# Patient Record
Sex: FEMALE | Race: WHITE | NOT HISPANIC OR LATINO
[De-identification: names, ages, dates, MRNs, and addresses within clinical notes are randomized per-mention and may not be internally consistent; named-entity substitution may affect disease eponyms.]

---

## 2017-06-28 ENCOUNTER — FORM ENCOUNTER (OUTPATIENT)
Age: 67
End: 2017-06-28

## 2017-06-29 ENCOUNTER — APPOINTMENT (OUTPATIENT)
Dept: ULTRASOUND IMAGING | Facility: CLINIC | Age: 67
End: 2017-06-29

## 2017-06-29 ENCOUNTER — OUTPATIENT (OUTPATIENT)
Dept: OUTPATIENT SERVICES | Facility: HOSPITAL | Age: 67
LOS: 1 days | End: 2017-06-29
Payer: MEDICARE

## 2017-06-29 ENCOUNTER — APPOINTMENT (OUTPATIENT)
Dept: MAMMOGRAPHY | Facility: CLINIC | Age: 67
End: 2017-06-29

## 2017-06-29 DIAGNOSIS — Z00.8 ENCOUNTER FOR OTHER GENERAL EXAMINATION: ICD-10-CM

## 2017-06-29 DIAGNOSIS — C50.919 MALIGNANT NEOPLASM OF UNSPECIFIED SITE OF UNSPECIFIED FEMALE BREAST: ICD-10-CM

## 2017-06-29 PROCEDURE — 77067 SCR MAMMO BI INCL CAD: CPT

## 2017-06-29 PROCEDURE — 77063 BREAST TOMOSYNTHESIS BI: CPT

## 2017-06-29 PROCEDURE — 76641 ULTRASOUND BREAST COMPLETE: CPT

## 2017-10-28 ENCOUNTER — TRANSCRIPTION ENCOUNTER (OUTPATIENT)
Age: 67
End: 2017-10-28

## 2017-11-08 ENCOUNTER — TRANSCRIPTION ENCOUNTER (OUTPATIENT)
Age: 67
End: 2017-11-08

## 2017-12-18 ENCOUNTER — APPOINTMENT (OUTPATIENT)
Dept: SURGICAL ONCOLOGY | Facility: CLINIC | Age: 67
End: 2017-12-18
Payer: MEDICARE

## 2017-12-18 VITALS
HEART RATE: 62 BPM | SYSTOLIC BLOOD PRESSURE: 108 MMHG | BODY MASS INDEX: 25.55 KG/M2 | DIASTOLIC BLOOD PRESSURE: 65 MMHG | WEIGHT: 159 LBS | OXYGEN SATURATION: 97 % | RESPIRATION RATE: 14 BRPM | HEIGHT: 66 IN

## 2017-12-18 PROCEDURE — 99213 OFFICE O/P EST LOW 20 MIN: CPT

## 2017-12-18 RX ORDER — ATORVASTATIN CALCIUM 80 MG/1
TABLET, FILM COATED ORAL
Refills: 0 | Status: ACTIVE | COMMUNITY

## 2017-12-18 RX ORDER — SACUBITRIL AND VALSARTAN 24; 26 MG/1; MG/1
24-26 TABLET, FILM COATED ORAL
Refills: 0 | Status: ACTIVE | COMMUNITY

## 2018-04-26 ENCOUNTER — RESULT REVIEW (OUTPATIENT)
Age: 68
End: 2018-04-26

## 2018-07-04 ENCOUNTER — FORM ENCOUNTER (OUTPATIENT)
Age: 68
End: 2018-07-04

## 2018-07-05 ENCOUNTER — APPOINTMENT (OUTPATIENT)
Dept: ULTRASOUND IMAGING | Facility: CLINIC | Age: 68
End: 2018-07-05
Payer: MEDICARE

## 2018-07-05 ENCOUNTER — APPOINTMENT (OUTPATIENT)
Dept: MAMMOGRAPHY | Facility: CLINIC | Age: 68
End: 2018-07-05
Payer: MEDICARE

## 2018-07-05 ENCOUNTER — OUTPATIENT (OUTPATIENT)
Dept: OUTPATIENT SERVICES | Facility: HOSPITAL | Age: 68
LOS: 1 days | End: 2018-07-05
Payer: MEDICARE

## 2018-07-05 DIAGNOSIS — C50.919 MALIGNANT NEOPLASM OF UNSPECIFIED SITE OF UNSPECIFIED FEMALE BREAST: ICD-10-CM

## 2018-07-05 PROCEDURE — 77067 SCR MAMMO BI INCL CAD: CPT | Mod: 26

## 2018-07-05 PROCEDURE — 76641 ULTRASOUND BREAST COMPLETE: CPT | Mod: 26,50

## 2018-07-05 PROCEDURE — 76641 ULTRASOUND BREAST COMPLETE: CPT

## 2018-07-05 PROCEDURE — 77067 SCR MAMMO BI INCL CAD: CPT

## 2018-07-05 PROCEDURE — 77063 BREAST TOMOSYNTHESIS BI: CPT

## 2018-07-05 PROCEDURE — 77063 BREAST TOMOSYNTHESIS BI: CPT | Mod: 26

## 2018-08-13 ENCOUNTER — APPOINTMENT (OUTPATIENT)
Dept: PULMONOLOGY | Facility: CLINIC | Age: 68
End: 2018-08-13

## 2018-12-17 ENCOUNTER — APPOINTMENT (OUTPATIENT)
Dept: SURGICAL ONCOLOGY | Facility: CLINIC | Age: 68
End: 2018-12-17
Payer: MEDICARE

## 2018-12-17 VITALS
WEIGHT: 159 LBS | HEIGHT: 66 IN | SYSTOLIC BLOOD PRESSURE: 107 MMHG | RESPIRATION RATE: 15 BRPM | HEART RATE: 78 BPM | BODY MASS INDEX: 25.55 KG/M2 | DIASTOLIC BLOOD PRESSURE: 70 MMHG

## 2018-12-17 PROCEDURE — 99214 OFFICE O/P EST MOD 30 MIN: CPT

## 2019-07-31 ENCOUNTER — FORM ENCOUNTER (OUTPATIENT)
Age: 69
End: 2019-07-31

## 2019-08-01 ENCOUNTER — APPOINTMENT (OUTPATIENT)
Dept: ULTRASOUND IMAGING | Facility: CLINIC | Age: 69
End: 2019-08-01
Payer: MEDICARE

## 2019-08-01 ENCOUNTER — APPOINTMENT (OUTPATIENT)
Dept: MAMMOGRAPHY | Facility: CLINIC | Age: 69
End: 2019-08-01
Payer: MEDICARE

## 2019-08-01 ENCOUNTER — OUTPATIENT (OUTPATIENT)
Dept: OUTPATIENT SERVICES | Facility: HOSPITAL | Age: 69
LOS: 1 days | End: 2019-08-01
Payer: MEDICARE

## 2019-08-01 DIAGNOSIS — Z00.8 ENCOUNTER FOR OTHER GENERAL EXAMINATION: ICD-10-CM

## 2019-08-01 PROCEDURE — 76641 ULTRASOUND BREAST COMPLETE: CPT | Mod: 26,50

## 2019-08-01 PROCEDURE — 76641 ULTRASOUND BREAST COMPLETE: CPT

## 2019-08-01 PROCEDURE — 77063 BREAST TOMOSYNTHESIS BI: CPT

## 2019-08-01 PROCEDURE — 77063 BREAST TOMOSYNTHESIS BI: CPT | Mod: 26

## 2019-08-01 PROCEDURE — 77067 SCR MAMMO BI INCL CAD: CPT | Mod: 26

## 2019-08-01 PROCEDURE — 77067 SCR MAMMO BI INCL CAD: CPT

## 2020-01-09 ENCOUNTER — APPOINTMENT (OUTPATIENT)
Dept: SURGICAL ONCOLOGY | Facility: CLINIC | Age: 70
End: 2020-01-09
Payer: MEDICARE

## 2020-01-09 VITALS
BODY MASS INDEX: 22.18 KG/M2 | HEART RATE: 97 BPM | WEIGHT: 138 LBS | RESPIRATION RATE: 15 BRPM | HEIGHT: 66 IN | DIASTOLIC BLOOD PRESSURE: 82 MMHG | SYSTOLIC BLOOD PRESSURE: 141 MMHG

## 2020-01-09 PROCEDURE — 99214 OFFICE O/P EST MOD 30 MIN: CPT

## 2020-01-10 NOTE — ASSESSMENT
[FreeTextEntry1] : Clinically doing well.\par \par August 2019 bilateral mammogram and sonogram at 450: BI-RADS 2.\par \par Studies will be repeated August 2020, prescription entered.\par \par October 2015 breast MRI was BI-RADS-1.\par \par Clinically doing well.\par If no problems we will see her in another year, sooner if needed

## 2020-01-10 NOTE — REVIEW OF SYSTEMS
[Negative] : Endocrine [FreeTextEntry5] : cAD [FreeTextEntry7] : gout [FreeTextEntry1] : breast cancer

## 2020-01-10 NOTE — HISTORY OF PRESENT ILLNESS
[de-identified] : She had a percutaneous mitral valve repair @[facility] on December 20, 2018\par May 2019 she had a defibrillator placed.\par August 2019 she had a HEART TRANSPLANT @[facility]\par \par 69 year-old Nulliparous lady who in November 2001 had LEFT breast conserving surgery for LN+ stage II breast cancer, with associated LCIS.\par +postoperative chemotherapy and Arimidex under the supervision of Dr. Romero Malave.\par Her postoperative radiation therapy was overseen by Isaiah Rodriguez\par \par Other bilateral breast Biopsies were benign.\par \par +FH:\par Her maternal grandmother and a maternal cousin had breast cancer.\par She prefers NOT to go for genetics evaluation.\par \par Her internist is Dr. 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 \par During the recuperation from the above heart transplant, she had a recurrent episode of gout.\par Now takes allopurinol.\par Her rheumatologist is Dr. Dawson\par \par Her cardiologist is Dr. Hemanth LOPEZ\par \par \par Her gynecologist is Dr. Drea SULTANA, February 2017 appointment was ok\par \par Colonoscopy: was March 2016 with DECLAN Pratt\par \par No signs or symptoms related to either breast

## 2020-01-10 NOTE — REASON FOR VISIT
[Follow-Up Visit] : a follow-up visit for [Other: _____] : [unfilled] [FreeTextEntry2] : followup left breast cancer

## 2020-08-07 ENCOUNTER — RESULT REVIEW (OUTPATIENT)
Age: 70
End: 2020-08-07

## 2020-08-07 ENCOUNTER — APPOINTMENT (OUTPATIENT)
Dept: ULTRASOUND IMAGING | Facility: CLINIC | Age: 70
End: 2020-08-07

## 2020-08-07 ENCOUNTER — OUTPATIENT (OUTPATIENT)
Dept: OUTPATIENT SERVICES | Facility: HOSPITAL | Age: 70
LOS: 1 days | End: 2020-08-07
Payer: MEDICARE

## 2020-08-07 ENCOUNTER — APPOINTMENT (OUTPATIENT)
Dept: MAMMOGRAPHY | Facility: CLINIC | Age: 70
End: 2020-08-07

## 2020-08-07 DIAGNOSIS — C50.919 MALIGNANT NEOPLASM OF UNSPECIFIED SITE OF UNSPECIFIED FEMALE BREAST: ICD-10-CM

## 2020-08-07 PROCEDURE — 76641 ULTRASOUND BREAST COMPLETE: CPT

## 2020-08-07 PROCEDURE — 77067 SCR MAMMO BI INCL CAD: CPT

## 2020-08-07 PROCEDURE — 76641 ULTRASOUND BREAST COMPLETE: CPT | Mod: 26,50

## 2020-08-07 PROCEDURE — 77063 BREAST TOMOSYNTHESIS BI: CPT

## 2020-08-07 PROCEDURE — 77063 BREAST TOMOSYNTHESIS BI: CPT | Mod: 26

## 2020-08-07 PROCEDURE — 77067 SCR MAMMO BI INCL CAD: CPT | Mod: 26

## 2021-01-07 ENCOUNTER — APPOINTMENT (OUTPATIENT)
Dept: SURGICAL ONCOLOGY | Facility: CLINIC | Age: 71
End: 2021-01-07
Payer: MEDICARE

## 2021-01-07 VITALS
HEIGHT: 65 IN | WEIGHT: 145 LBS | RESPIRATION RATE: 15 BRPM | BODY MASS INDEX: 24.16 KG/M2 | SYSTOLIC BLOOD PRESSURE: 172 MMHG | OXYGEN SATURATION: 98 % | HEART RATE: 106 BPM | DIASTOLIC BLOOD PRESSURE: 99 MMHG

## 2021-01-07 PROCEDURE — 99214 OFFICE O/P EST MOD 30 MIN: CPT

## 2021-01-07 NOTE — PHYSICAL EXAM
[Normal] : supple, no neck mass and thyroid not enlarged [Normal Neck Lymph Nodes] : normal neck lymph nodes  [Normal Supraclavicular Lymph Nodes] : normal supraclavicular lymph nodes [Normal Axillary Lymph Nodes] : normal axillary lymph nodes [Normal] : normal appearance, no rash, nodules, vesicles, ulcers, erythema [de-identified] : Groins not examined [de-identified] : below

## 2021-01-07 NOTE — REVIEW OF SYSTEMS
[Negative] : Integumentary [FreeTextEntry5] : Heart transplant [FreeTextEntry7] : Gout [de-identified] : Steroids induced diabetes [FreeTextEntry1] : Breast cancer

## 2021-01-07 NOTE — HISTORY OF PRESENT ILLNESS
[de-identified] : She had a percutaneous mitral valve repair @[facility] on December 20, 2018\par May 2019 she had a defibrillator placed.\par August 2019 she had a HEART TRANSPLANT @[facility]\par \par September 2020:\par Diagnosed and treated for stage Ib RIGHT LUNG CANCER thoracic surgery at [facility].\par As was a solitary pulmonary nodule which had changed on follow-up imaging.\par \par 70 year-old Nulliparous lady who in November 2001 had LEFT breast conserving surgery for LN+ stage II breast cancer, with associated LCIS.\par +postoperative chemotherapy and Arimidex under the supervision of Dr. Romero Malave.\par Her postoperative radiation therapy was overseen by Isaiah Rodriguez\par \par No signs or symptoms related to either breast\par \par Other bilateral breast Biopsies were benign.\par \par +FH:\par Her maternal grandmother AND a maternal cousin had breast cancer.\par \par \par She prefers NOT to go for genetics evaluation.\par \par \par Her internist is Dr. Mary LOPEZ\par \par +AICD\par \par Her cardiologist is Dr. 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 \par During the recuperation from the above heart transplant, she had a recurrent episode of gout.\par Now takes allopurinol.\par Her rheumatologist is Dr. Arian CHI\par \par \par Her gynecologist is Dr. Drea SULTANA, February 2020 appointment was ok\par \par \par Colonoscopy: was March 2016 with DECLAN Pratt\par \par

## 2021-01-07 NOTE — ASSESSMENT
[FreeTextEntry1] : Clinically doing well.\par \par August 2020 bilateral mammogram and sonogram at 450: BI-RADS 2.\par \par Studies will be repeated August 2021, prescription entered.\par \par October 2015 breast MRI was BI-RADS-1.\par Follow-up MRI is contraindicated because of her defibrillator (AICD)\par \par Clinically doing well.\par If no problems we will see her in another year, sooner if needed

## 2021-06-22 ENCOUNTER — RESULT REVIEW (OUTPATIENT)
Age: 71
End: 2021-06-22

## 2021-08-10 ENCOUNTER — RESULT REVIEW (OUTPATIENT)
Age: 71
End: 2021-08-10

## 2021-08-10 ENCOUNTER — APPOINTMENT (OUTPATIENT)
Dept: MAMMOGRAPHY | Facility: CLINIC | Age: 71
End: 2021-08-10
Payer: MEDICARE

## 2021-08-10 ENCOUNTER — OUTPATIENT (OUTPATIENT)
Dept: OUTPATIENT SERVICES | Facility: HOSPITAL | Age: 71
LOS: 1 days | End: 2021-08-10
Payer: MEDICARE

## 2021-08-10 ENCOUNTER — APPOINTMENT (OUTPATIENT)
Dept: ULTRASOUND IMAGING | Facility: CLINIC | Age: 71
End: 2021-08-10
Payer: MEDICARE

## 2021-08-10 DIAGNOSIS — C50.919 MALIGNANT NEOPLASM OF UNSPECIFIED SITE OF UNSPECIFIED FEMALE BREAST: ICD-10-CM

## 2021-08-10 PROCEDURE — 77063 BREAST TOMOSYNTHESIS BI: CPT

## 2021-08-10 PROCEDURE — 77067 SCR MAMMO BI INCL CAD: CPT | Mod: 26

## 2021-08-10 PROCEDURE — 76641 ULTRASOUND BREAST COMPLETE: CPT | Mod: 26,50

## 2021-08-10 PROCEDURE — 77067 SCR MAMMO BI INCL CAD: CPT

## 2021-08-10 PROCEDURE — 76641 ULTRASOUND BREAST COMPLETE: CPT

## 2021-08-10 PROCEDURE — 77063 BREAST TOMOSYNTHESIS BI: CPT | Mod: 26

## 2022-01-06 ENCOUNTER — APPOINTMENT (OUTPATIENT)
Dept: SURGICAL ONCOLOGY | Facility: CLINIC | Age: 72
End: 2022-01-06
Payer: MEDICARE

## 2022-01-06 VITALS
HEIGHT: 65 IN | WEIGHT: 148 LBS | BODY MASS INDEX: 24.66 KG/M2 | HEART RATE: 102 BPM | OXYGEN SATURATION: 96 % | DIASTOLIC BLOOD PRESSURE: 85 MMHG | SYSTOLIC BLOOD PRESSURE: 138 MMHG | RESPIRATION RATE: 16 BRPM | TEMPERATURE: 98.3 F

## 2022-01-06 PROCEDURE — 99214 OFFICE O/P EST MOD 30 MIN: CPT

## 2022-01-06 NOTE — REVIEW OF SYSTEMS
[Negative] : Heme/Lymph [FreeTextEntry5] : AICD [FreeTextEntry6] : Lung cancer [FreeTextEntry7] : Heart transplant [de-identified] : gout [de-identified] : DM [FreeTextEntry1] : breast cancer

## 2022-01-06 NOTE — PHYSICAL EXAM
[Normal] : supple, no neck mass and thyroid not enlarged [Normal Neck Lymph Nodes] : normal neck lymph nodes  [Normal Supraclavicular Lymph Nodes] : normal supraclavicular lymph nodes [Normal Axillary Lymph Nodes] : normal axillary lymph nodes [Normal] : normal appearance, no rash, nodules, vesicles, ulcers, erythema [de-identified] : Groins not examined [de-identified] : below

## 2022-01-06 NOTE — HISTORY OF PRESENT ILLNESS
[de-identified] : She had a percutaneous mitral valve repair @[facility] on December 20, 2018\par May 2019 she had a defibrillator placed.\par August 2019 she had a HEART TRANSPLANT @[facility]\par \par September 2020:\par Diagnosed and treated for stage Ib RIGHT LUNG CANCER thoracic surgery at [facility].\par It was a solitary pulmonary nodule which had changed on follow-up imaging.\par \par \par 71 year-old Nulliparous lady:\par November 2001: LEFT breast conserving surgery for LN+ stage II breast cancer\par +associated LCIS.\par +postoperative chemotherapy and Arimidex: Dr. Romero Finch.\par + postoperative radiation therapy: Isaiah JEFFERSON\par \par No signs or symptoms related to either breast\par \par +Prior personal hx:\par Other bilateral breast Biopsies were benign.\par \par +FH:\par Her Paternal grandmother AND a Maternal cousin had breast cancer.\par No relatives w/ ovarian cancer\par \par + Ashkenazi.\par \par No other FH of malignancy.\par \par \par She prefers NOT to go for genetics evaluation.\par \par Reproductive hx:\par Menarche at 13\par Nulliparous.\par Chemotherapy induced menopause at 51.\par No HRT\par \par \par Her internist is Dr. Mary LOPEZ\par \par +AICD\par \par Her cardiologist is Dr. Hemanth LOPEZ\par \par Current medications:\par Amlodipine for hypertension.\par Atorvastatin hyperlipidemia.\par \par Immunosuppressive agent: Tacrolimus.\par \par She is on Protonix to prevent stress gastritis bleeding.\par \par 2017 episode of gout was treated at urgent care and by a podiatrist with a steroid taper. \par During the recuperation from the above heart transplant, she had a recurrent episode of gout.\par Now takes allopurinol.\par Her rheumatologist is Dr. Arian CHI\par \par \par Her gynecologist is Dr. Drea SULTANA, February 2020 appointment was ok\par \par \par Colonoscopy: was March 2016 with Arian Pratt.\par Fall 2021 colonoscopy with Dr. Leonard LEES was normal\par \par

## 2022-01-06 NOTE — ASSESSMENT
[FreeTextEntry1] : Clinically doing well.\par \par \par Pulm imag........................\par \par \par August 2021 bilateral mammogram and sonogram at 450: BI-RADS 2.\par \par Studies will be repeated August 2022, prescription entered.\par \par October 2015 breast MRI was BI-RADS-1.\par Follow-up MRI is contraindicated because of her defibrillator (AICD)\par \par Clinically doing well.\par If no problems we will see her in another year, sooner if needed

## 2022-08-22 ENCOUNTER — APPOINTMENT (OUTPATIENT)
Dept: MAMMOGRAPHY | Facility: CLINIC | Age: 72
End: 2022-08-22

## 2022-08-22 ENCOUNTER — RESULT REVIEW (OUTPATIENT)
Age: 72
End: 2022-08-22

## 2022-08-22 ENCOUNTER — APPOINTMENT (OUTPATIENT)
Dept: ULTRASOUND IMAGING | Facility: CLINIC | Age: 72
End: 2022-08-22

## 2022-08-22 ENCOUNTER — OUTPATIENT (OUTPATIENT)
Dept: OUTPATIENT SERVICES | Facility: HOSPITAL | Age: 72
LOS: 1 days | End: 2022-08-22
Payer: MEDICARE

## 2022-08-22 DIAGNOSIS — C50.919 MALIGNANT NEOPLASM OF UNSPECIFIED SITE OF UNSPECIFIED FEMALE BREAST: ICD-10-CM

## 2022-08-22 PROCEDURE — 76641 ULTRASOUND BREAST COMPLETE: CPT | Mod: 26,50

## 2022-08-22 PROCEDURE — 77067 SCR MAMMO BI INCL CAD: CPT

## 2022-08-22 PROCEDURE — 76641 ULTRASOUND BREAST COMPLETE: CPT

## 2022-08-22 PROCEDURE — 77063 BREAST TOMOSYNTHESIS BI: CPT | Mod: 26

## 2022-08-22 PROCEDURE — 77063 BREAST TOMOSYNTHESIS BI: CPT

## 2022-08-22 PROCEDURE — 77067 SCR MAMMO BI INCL CAD: CPT | Mod: 26

## 2023-04-20 ENCOUNTER — APPOINTMENT (OUTPATIENT)
Dept: SURGICAL ONCOLOGY | Facility: CLINIC | Age: 73
End: 2023-04-20
Payer: MEDICARE

## 2023-04-20 VITALS
SYSTOLIC BLOOD PRESSURE: 147 MMHG | BODY MASS INDEX: 24.32 KG/M2 | DIASTOLIC BLOOD PRESSURE: 87 MMHG | TEMPERATURE: 97.6 F | HEART RATE: 86 BPM | RESPIRATION RATE: 16 BRPM | OXYGEN SATURATION: 98 % | WEIGHT: 146 LBS | HEIGHT: 65 IN

## 2023-04-20 PROCEDURE — 99214 OFFICE O/P EST MOD 30 MIN: CPT

## 2023-04-21 NOTE — HISTORY OF PRESENT ILLNESS
[de-identified] : She had a percutaneous mitral valve repair @[facility] on December 20, 2018\par May 2019 she had a defibrillator placed.\par August 2019 she had a HEART TRANSPLANT @[facility]\par \par September 2020:\par Diagnosed and treated for stage Ib RIGHT LUNG CANCER thoracic surgery at [facility].\par It was a solitary pulmonary nodule which had changed on follow-up imaging.\par \par \par 72-year-old lady.\par \par Nulliparous.\par \par November 2001: LEFT breast conserving surgery for LN+ stage II breast cancer\par +associated LCIS.\par +postoperative chemotherapy and Arimidex: Dr. Romero Finch.\par + postoperative radiation therapy: Isaiah JEFFERSON\par \par No signs or symptoms related to either breast\par \par +Prior personal hx:\par Other bilateral breast Biopsies were benign.\par \par +FH:\par Her Paternal grandmother AND a Maternal cousin had breast cancer.\par No relatives w/ ovarian cancer\par \par + Ashkenazi.\par \par No other FH of malignancy.\par \par \par She prefers NOT to go for genetics evaluation.\par \par Reproductive hx:\par Menarche at 13\par Nulliparous.\par Chemotherapy induced menopause at 51.\par No HRT\par \par \par PMD: Dr. Gisella KUHN.\par She used to see Dr. Mary Meehan\par \par Her defibrillator was removed in August 2019 when she had a heart transplant\par \par Her cardiologist is Dr. Hemanth LOPEZ\par \par Current medications:\par Amlodipine for hypertension.\par Atorvastatin hyperlipidemia.\par \par Immunosuppressive agent: Tacrolimus.\par \par She is on Protonix to prevent stress gastritis bleeding.\par \par 2017 episode of gout was treated at urgent care and by a podiatrist with a steroid taper. \par During the recuperation from the above heart transplant, she had a recurrent episode of gout.\par Now takes allopurinol.\par Her rheumatologist is Dr. Arian CHI\par \par \par Her gynecologist is Dr. Drea SULTANA, February 2020 appointment was ok.\par 2023 follow-up is pending\par \par \par Colonoscopy: was March 2016 with Arian Pratt.\par Fall 2021 colonoscopy with Dr. Leonard LEES was normal\par \par

## 2023-04-21 NOTE — PHYSICAL EXAM
[Normal] : supple, no neck mass and thyroid not enlarged [Normal Neck Lymph Nodes] : normal neck lymph nodes  [Normal Supraclavicular Lymph Nodes] : normal supraclavicular lymph nodes [Normal Axillary Lymph Nodes] : normal axillary lymph nodes [Normal] : normal appearance, no rash, nodules, vesicles, ulcers, erythema [de-identified] : Groins not examined [de-identified] : below

## 2023-04-21 NOTE — REVIEW OF SYSTEMS
[Negative] : Endocrine [FreeTextEntry5] : Heart transplant [FreeTextEntry6] : Lung cancer [FreeTextEntry1] : Breast cancer

## 2023-04-21 NOTE — ASSESSMENT
[FreeTextEntry1] : Pulmonary imaging is through Dr. COOK, medical oncology at [facility].\par \par Clinically doing well.\par \par August 2022:\par Bilateral mammogram and sonogram were normal.\par Prescription entered today for August 2023.\par \par October 2015 breast MRI: BI-RADS 1.\par \par Clinically doing well.\par \par If she remains asymptomatic, with normal imaging, we will continue to see her yearly, sooner if needed

## 2023-04-21 NOTE — REASON FOR VISIT
[Follow-Up Visit] : a follow-up visit for [Other: _____] : [unfilled] [FreeTextEntry2] : Left breast cancer treated with breast conservation.  Subsequent heart transplant, then right lung cancer.

## 2023-08-23 ENCOUNTER — OUTPATIENT (OUTPATIENT)
Dept: OUTPATIENT SERVICES | Facility: HOSPITAL | Age: 73
LOS: 1 days | End: 2023-08-23
Payer: MEDICARE

## 2023-08-23 ENCOUNTER — RESULT REVIEW (OUTPATIENT)
Age: 73
End: 2023-08-23

## 2023-08-23 ENCOUNTER — APPOINTMENT (OUTPATIENT)
Dept: ULTRASOUND IMAGING | Facility: CLINIC | Age: 73
End: 2023-08-23
Payer: MEDICARE

## 2023-08-23 ENCOUNTER — APPOINTMENT (OUTPATIENT)
Dept: MAMMOGRAPHY | Facility: CLINIC | Age: 73
End: 2023-08-23
Payer: MEDICARE

## 2023-08-23 DIAGNOSIS — C50.919 MALIGNANT NEOPLASM OF UNSPECIFIED SITE OF UNSPECIFIED FEMALE BREAST: ICD-10-CM

## 2023-08-23 PROCEDURE — 77067 SCR MAMMO BI INCL CAD: CPT

## 2023-08-23 PROCEDURE — 77063 BREAST TOMOSYNTHESIS BI: CPT | Mod: 26

## 2023-08-23 PROCEDURE — 76641 ULTRASOUND BREAST COMPLETE: CPT

## 2023-08-23 PROCEDURE — 76641 ULTRASOUND BREAST COMPLETE: CPT | Mod: 26,50,GY,GC

## 2023-08-23 PROCEDURE — 77067 SCR MAMMO BI INCL CAD: CPT | Mod: 26

## 2023-08-23 PROCEDURE — 77063 BREAST TOMOSYNTHESIS BI: CPT

## 2024-01-08 ENCOUNTER — APPOINTMENT (OUTPATIENT)
Dept: RADIOLOGY | Facility: CLINIC | Age: 74
End: 2024-01-08
Payer: MEDICARE

## 2024-01-08 ENCOUNTER — OUTPATIENT (OUTPATIENT)
Dept: OUTPATIENT SERVICES | Facility: HOSPITAL | Age: 74
LOS: 1 days | End: 2024-01-08
Payer: MEDICARE

## 2024-01-08 DIAGNOSIS — Z78.0 ASYMPTOMATIC MENOPAUSAL STATE: ICD-10-CM

## 2024-01-08 DIAGNOSIS — Z00.8 ENCOUNTER FOR OTHER GENERAL EXAMINATION: ICD-10-CM

## 2024-01-08 PROCEDURE — 77080 DXA BONE DENSITY AXIAL: CPT

## 2024-01-08 PROCEDURE — 77080 DXA BONE DENSITY AXIAL: CPT | Mod: 26

## 2024-04-25 ENCOUNTER — APPOINTMENT (OUTPATIENT)
Dept: SURGICAL ONCOLOGY | Facility: CLINIC | Age: 74
End: 2024-04-25
Payer: MEDICARE

## 2024-04-25 VITALS
SYSTOLIC BLOOD PRESSURE: 121 MMHG | BODY MASS INDEX: 24.16 KG/M2 | OXYGEN SATURATION: 97 % | HEIGHT: 65 IN | RESPIRATION RATE: 17 BRPM | WEIGHT: 145 LBS | DIASTOLIC BLOOD PRESSURE: 77 MMHG | HEART RATE: 92 BPM

## 2024-04-25 DIAGNOSIS — C50.919 MALIGNANT NEOPLASM OF UNSPECIFIED SITE OF UNSPECIFIED FEMALE BREAST: ICD-10-CM

## 2024-04-25 PROCEDURE — 99214 OFFICE O/P EST MOD 30 MIN: CPT

## 2024-04-25 NOTE — PHYSICAL EXAM
[Normal] : supple, no neck mass and thyroid not enlarged [Normal Neck Lymph Nodes] : normal neck lymph nodes  [Normal Supraclavicular Lymph Nodes] : normal supraclavicular lymph nodes [Normal Axillary Lymph Nodes] : normal axillary lymph nodes [Normal] : normal appearance, no rash, nodules, vesicles, ulcers, erythema

## 2024-08-26 ENCOUNTER — APPOINTMENT (OUTPATIENT)
Dept: ULTRASOUND IMAGING | Facility: CLINIC | Age: 74
End: 2024-08-26
Payer: MEDICARE

## 2024-08-26 ENCOUNTER — APPOINTMENT (OUTPATIENT)
Dept: MAMMOGRAPHY | Facility: CLINIC | Age: 74
End: 2024-08-26
Payer: MEDICARE

## 2024-08-26 ENCOUNTER — OUTPATIENT (OUTPATIENT)
Dept: OUTPATIENT SERVICES | Facility: HOSPITAL | Age: 74
LOS: 1 days | End: 2024-08-26
Payer: MEDICARE

## 2024-08-26 ENCOUNTER — RESULT REVIEW (OUTPATIENT)
Age: 74
End: 2024-08-26

## 2024-08-26 DIAGNOSIS — C50.919 MALIGNANT NEOPLASM OF UNSPECIFIED SITE OF UNSPECIFIED FEMALE BREAST: ICD-10-CM

## 2024-08-26 PROCEDURE — 77063 BREAST TOMOSYNTHESIS BI: CPT | Mod: 26

## 2024-08-26 PROCEDURE — 76641 ULTRASOUND BREAST COMPLETE: CPT | Mod: 26,50,GY

## 2024-08-26 PROCEDURE — 77067 SCR MAMMO BI INCL CAD: CPT | Mod: 26

## 2024-08-26 PROCEDURE — 77067 SCR MAMMO BI INCL CAD: CPT

## 2024-08-26 PROCEDURE — 76641 ULTRASOUND BREAST COMPLETE: CPT

## 2024-08-26 PROCEDURE — 77063 BREAST TOMOSYNTHESIS BI: CPT

## 2025-05-01 ENCOUNTER — APPOINTMENT (OUTPATIENT)
Dept: SURGICAL ONCOLOGY | Facility: CLINIC | Age: 75
End: 2025-05-01
Payer: MEDICARE

## 2025-05-01 DIAGNOSIS — C50.919 MALIGNANT NEOPLASM OF UNSPECIFIED SITE OF UNSPECIFIED FEMALE BREAST: ICD-10-CM

## 2025-05-01 PROCEDURE — 99215 OFFICE O/P EST HI 40 MIN: CPT

## 2025-05-01 PROCEDURE — G2212 PROLONG OUTPT/OFFICE VIS: CPT

## 2025-09-04 ENCOUNTER — RESULT REVIEW (OUTPATIENT)
Age: 75
End: 2025-09-04

## 2025-09-04 ENCOUNTER — OUTPATIENT (OUTPATIENT)
Dept: OUTPATIENT SERVICES | Facility: HOSPITAL | Age: 75
LOS: 1 days | End: 2025-09-04
Payer: MEDICARE

## 2025-09-04 ENCOUNTER — APPOINTMENT (OUTPATIENT)
Dept: MAMMOGRAPHY | Facility: CLINIC | Age: 75
End: 2025-09-04
Payer: MEDICARE

## 2025-09-04 ENCOUNTER — APPOINTMENT (OUTPATIENT)
Dept: ULTRASOUND IMAGING | Facility: CLINIC | Age: 75
End: 2025-09-04
Payer: MEDICARE

## 2025-09-04 DIAGNOSIS — C50.919 MALIGNANT NEOPLASM OF UNSPECIFIED SITE OF UNSPECIFIED FEMALE BREAST: ICD-10-CM

## 2025-09-04 PROCEDURE — 77067 SCR MAMMO BI INCL CAD: CPT | Mod: 26

## 2025-09-04 PROCEDURE — 77063 BREAST TOMOSYNTHESIS BI: CPT | Mod: 26

## 2025-09-04 PROCEDURE — 76641 ULTRASOUND BREAST COMPLETE: CPT | Mod: 26,50,GA

## 2025-09-04 PROCEDURE — 77067 SCR MAMMO BI INCL CAD: CPT

## 2025-09-04 PROCEDURE — 77063 BREAST TOMOSYNTHESIS BI: CPT

## 2025-09-04 PROCEDURE — 76641 ULTRASOUND BREAST COMPLETE: CPT
